# Patient Record
Sex: MALE | Race: ASIAN | Employment: UNEMPLOYED | URBAN - METROPOLITAN AREA
[De-identification: names, ages, dates, MRNs, and addresses within clinical notes are randomized per-mention and may not be internally consistent; named-entity substitution may affect disease eponyms.]

---

## 2020-02-10 ENCOUNTER — OFFICE VISIT (OUTPATIENT)
Dept: INTERNAL MEDICINE CLINIC | Age: 50
End: 2020-02-10
Payer: COMMERCIAL

## 2020-02-10 VITALS
SYSTOLIC BLOOD PRESSURE: 116 MMHG | BODY MASS INDEX: 32.51 KG/M2 | HEIGHT: 61 IN | DIASTOLIC BLOOD PRESSURE: 86 MMHG | OXYGEN SATURATION: 99 % | HEART RATE: 80 BPM | WEIGHT: 172.2 LBS

## 2020-02-10 PROBLEM — Z00.00 WELL ADULT EXAM: Status: ACTIVE | Noted: 2020-02-10

## 2020-02-10 PROCEDURE — 99386 PREV VISIT NEW AGE 40-64: CPT | Performed by: INTERNAL MEDICINE

## 2020-02-10 PROCEDURE — G8484 FLU IMMUNIZE NO ADMIN: HCPCS | Performed by: INTERNAL MEDICINE

## 2020-02-10 SDOH — ECONOMIC STABILITY: INCOME INSECURITY: HOW HARD IS IT FOR YOU TO PAY FOR THE VERY BASICS LIKE FOOD, HOUSING, MEDICAL CARE, AND HEATING?: NOT HARD AT ALL

## 2020-02-10 SDOH — ECONOMIC STABILITY: FOOD INSECURITY: WITHIN THE PAST 12 MONTHS, THE FOOD YOU BOUGHT JUST DIDN'T LAST AND YOU DIDN'T HAVE MONEY TO GET MORE.: NEVER TRUE

## 2020-02-10 SDOH — ECONOMIC STABILITY: TRANSPORTATION INSECURITY
IN THE PAST 12 MONTHS, HAS THE LACK OF TRANSPORTATION KEPT YOU FROM MEDICAL APPOINTMENTS OR FROM GETTING MEDICATIONS?: NO

## 2020-02-10 SDOH — ECONOMIC STABILITY: TRANSPORTATION INSECURITY
IN THE PAST 12 MONTHS, HAS LACK OF TRANSPORTATION KEPT YOU FROM MEETINGS, WORK, OR FROM GETTING THINGS NEEDED FOR DAILY LIVING?: NO

## 2020-02-10 SDOH — ECONOMIC STABILITY: FOOD INSECURITY: WITHIN THE PAST 12 MONTHS, YOU WORRIED THAT YOUR FOOD WOULD RUN OUT BEFORE YOU GOT MONEY TO BUY MORE.: NEVER TRUE

## 2020-02-10 SDOH — HEALTH STABILITY: MENTAL HEALTH: HOW OFTEN DO YOU HAVE A DRINK CONTAINING ALCOHOL?: NEVER

## 2020-02-10 ASSESSMENT — ENCOUNTER SYMPTOMS
SHORTNESS OF BREATH: 0
CHEST TIGHTNESS: 0
CONSTIPATION: 0
DIARRHEA: 0

## 2020-02-10 ASSESSMENT — PATIENT HEALTH QUESTIONNAIRE - PHQ9
SUM OF ALL RESPONSES TO PHQ QUESTIONS 1-9: 0
2. FEELING DOWN, DEPRESSED OR HOPELESS: 0
1. LITTLE INTEREST OR PLEASURE IN DOING THINGS: 0
SUM OF ALL RESPONSES TO PHQ QUESTIONS 1-9: 0
SUM OF ALL RESPONSES TO PHQ9 QUESTIONS 1 & 2: 0

## 2020-02-10 NOTE — PROGRESS NOTES
Patient: Marcelo Chacko is a 44 y.o. male who presents today with the following Chief Complaint(s):  Chief Complaint   Patient presents with    New Patient       HPI     Patient's primary language is Kish[place]. Interview is conducted via certified [employer] . Here today to establish. No significant PMHX. No medications. Has not complaints today. Did have a colonscopy in [place] that was negative. Was told to repeat in 10 years. Also had fasting blood work in [place] that was normal but cholesterol was a little high. Has been working on making some changes to his diet- doesn't eat a lot of meat and has been eating less rice. Has also cut out breakfast.     Has some concerns about his vision and his dental care. Labs from [place] from September 16, 2019 total cholesterol was 233; HDL 32; triglycerides 451; LDL unmeasurable due to high triglycerides    No Known Allergies   History reviewed. No pertinent past medical history. History reviewed. No pertinent surgical history.    Social History     Socioeconomic History    Marital status:      Spouse name: Not on file    Number of children: Not on file    Years of education: Not on file    Highest education level: Not on file   Occupational History    Not on file   Social Needs    Financial resource strain: Not hard at all    Food insecurity:     Worry: Never true     Inability: Never true   [employer] needs:     Medical: No     Non-medical: No   Tobacco Use    Smoking status: Never Smoker    Smokeless tobacco: Former User     Types: Chew   Substance and Sexual Activity    Alcohol use: Never     Frequency: Never    Drug use: Not on file    Sexual activity: Not on file   Lifestyle    Physical activity:     Days per week: Not on file     Minutes per session: Not on file    Stress: Not on file   Relationships    Social connections:     Talks on phone: Not on file     Gets together: Not on file     Attends Bahai service: Not on file

## 2020-03-11 PROBLEM — Z00.00 WELL ADULT EXAM: Status: RESOLVED | Noted: 2020-02-10 | Resolved: 2020-03-11

## 2021-02-08 DIAGNOSIS — Z12.5 PROSTATE CANCER SCREENING: ICD-10-CM

## 2021-02-08 DIAGNOSIS — Z00.00 WELL ADULT EXAM: ICD-10-CM

## 2021-02-08 DIAGNOSIS — E78.2 MIXED HYPERLIPIDEMIA: ICD-10-CM

## 2021-02-08 LAB
A/G RATIO: 1.3 (ref 1.1–2.2)
ALBUMIN SERPL-MCNC: 4.3 G/DL (ref 3.4–5)
ALP BLD-CCNC: 88 U/L (ref 40–129)
ALT SERPL-CCNC: 29 U/L (ref 10–40)
ANION GAP SERPL CALCULATED.3IONS-SCNC: 10 MMOL/L (ref 3–16)
AST SERPL-CCNC: 21 U/L (ref 15–37)
BASOPHILS ABSOLUTE: 0.1 K/UL (ref 0–0.2)
BASOPHILS RELATIVE PERCENT: 0.6 %
BILIRUB SERPL-MCNC: 0.3 MG/DL (ref 0–1)
BUN BLDV-MCNC: 7 MG/DL (ref 7–20)
CALCIUM SERPL-MCNC: 9.3 MG/DL (ref 8.3–10.6)
CHLORIDE BLD-SCNC: 100 MMOL/L (ref 99–110)
CHOLESTEROL, TOTAL: 262 MG/DL (ref 0–199)
CO2: 29 MMOL/L (ref 21–32)
CREAT SERPL-MCNC: 0.9 MG/DL (ref 0.9–1.3)
EOSINOPHILS ABSOLUTE: 0.3 K/UL (ref 0–0.6)
EOSINOPHILS RELATIVE PERCENT: 3.2 %
GFR AFRICAN AMERICAN: >60
GFR NON-AFRICAN AMERICAN: >60
GLOBULIN: 3.2 G/DL
GLUCOSE BLD-MCNC: 102 MG/DL (ref 70–99)
HCT VFR BLD CALC: 44 % (ref 40.5–52.5)
HDLC SERPL-MCNC: 31 MG/DL (ref 40–60)
HEMOGLOBIN: 14.5 G/DL (ref 13.5–17.5)
LDL CHOLESTEROL CALCULATED: ABNORMAL MG/DL
LDL CHOLESTEROL DIRECT: 159 MG/DL
LYMPHOCYTES ABSOLUTE: 3 K/UL (ref 1–5.1)
LYMPHOCYTES RELATIVE PERCENT: 32.6 %
MCH RBC QN AUTO: 27.8 PG (ref 26–34)
MCHC RBC AUTO-ENTMCNC: 33 G/DL (ref 31–36)
MCV RBC AUTO: 84.1 FL (ref 80–100)
MONOCYTES ABSOLUTE: 0.6 K/UL (ref 0–1.3)
MONOCYTES RELATIVE PERCENT: 6.1 %
NEUTROPHILS ABSOLUTE: 5.3 K/UL (ref 1.7–7.7)
NEUTROPHILS RELATIVE PERCENT: 57.5 %
PDW BLD-RTO: 13.8 % (ref 12.4–15.4)
PLATELET # BLD: 199 K/UL (ref 135–450)
PMV BLD AUTO: 10.9 FL (ref 5–10.5)
POTASSIUM SERPL-SCNC: 4 MMOL/L (ref 3.5–5.1)
PROSTATE SPECIFIC ANTIGEN: 2.68 NG/ML (ref 0–4)
RBC # BLD: 5.24 M/UL (ref 4.2–5.9)
SODIUM BLD-SCNC: 139 MMOL/L (ref 136–145)
T4 FREE: 1.1 NG/DL (ref 0.9–1.8)
TOTAL PROTEIN: 7.5 G/DL (ref 6.4–8.2)
TRIGL SERPL-MCNC: 472 MG/DL (ref 0–150)
TSH REFLEX: 5.02 UIU/ML (ref 0.27–4.2)
VLDLC SERPL CALC-MCNC: ABNORMAL MG/DL
WBC # BLD: 9.2 K/UL (ref 4–11)

## 2021-11-10 ENCOUNTER — OFFICE VISIT (OUTPATIENT)
Dept: INTERNAL MEDICINE CLINIC | Age: 51
End: 2021-11-10
Payer: COMMERCIAL

## 2021-11-10 VITALS
OXYGEN SATURATION: 99 % | BODY MASS INDEX: 32.9 KG/M2 | WEIGHT: 173 LBS | DIASTOLIC BLOOD PRESSURE: 88 MMHG | HEART RATE: 72 BPM | SYSTOLIC BLOOD PRESSURE: 126 MMHG

## 2021-11-10 DIAGNOSIS — Z00.00 WELL ADULT EXAM: Primary | ICD-10-CM

## 2021-11-10 DIAGNOSIS — Z12.5 PROSTATE CANCER SCREENING: ICD-10-CM

## 2021-11-10 DIAGNOSIS — Z12.11 COLON CANCER SCREENING: ICD-10-CM

## 2021-11-10 DIAGNOSIS — Z00.00 WELL ADULT EXAM: ICD-10-CM

## 2021-11-10 LAB
A/G RATIO: 1.5 (ref 1.1–2.2)
ALBUMIN SERPL-MCNC: 4.6 G/DL (ref 3.4–5)
ALP BLD-CCNC: 85 U/L (ref 40–129)
ALT SERPL-CCNC: 40 U/L (ref 10–40)
ANION GAP SERPL CALCULATED.3IONS-SCNC: 14 MMOL/L (ref 3–16)
AST SERPL-CCNC: 26 U/L (ref 15–37)
BASOPHILS ABSOLUTE: 0.1 K/UL (ref 0–0.2)
BASOPHILS RELATIVE PERCENT: 0.8 %
BILIRUB SERPL-MCNC: 0.5 MG/DL (ref 0–1)
BUN BLDV-MCNC: 8 MG/DL (ref 7–20)
CALCIUM SERPL-MCNC: 9 MG/DL (ref 8.3–10.6)
CHLORIDE BLD-SCNC: 100 MMOL/L (ref 99–110)
CHOLESTEROL, TOTAL: 258 MG/DL (ref 0–199)
CO2: 26 MMOL/L (ref 21–32)
CREAT SERPL-MCNC: 0.9 MG/DL (ref 0.9–1.3)
EOSINOPHILS ABSOLUTE: 0.4 K/UL (ref 0–0.6)
EOSINOPHILS RELATIVE PERCENT: 4.7 %
GFR AFRICAN AMERICAN: >60
GFR NON-AFRICAN AMERICAN: >60
GLUCOSE BLD-MCNC: 84 MG/DL (ref 70–99)
HCT VFR BLD CALC: 42.2 % (ref 40.5–52.5)
HDLC SERPL-MCNC: 35 MG/DL (ref 40–60)
HEMOGLOBIN: 14.2 G/DL (ref 13.5–17.5)
LDL CHOLESTEROL CALCULATED: 171 MG/DL
LYMPHOCYTES ABSOLUTE: 2.5 K/UL (ref 1–5.1)
LYMPHOCYTES RELATIVE PERCENT: 29.4 %
MCH RBC QN AUTO: 27.9 PG (ref 26–34)
MCHC RBC AUTO-ENTMCNC: 33.6 G/DL (ref 31–36)
MCV RBC AUTO: 83.2 FL (ref 80–100)
MONOCYTES ABSOLUTE: 0.5 K/UL (ref 0–1.3)
MONOCYTES RELATIVE PERCENT: 5.5 %
NEUTROPHILS ABSOLUTE: 5 K/UL (ref 1.7–7.7)
NEUTROPHILS RELATIVE PERCENT: 59.6 %
PDW BLD-RTO: 13.9 % (ref 12.4–15.4)
PLATELET # BLD: 204 K/UL (ref 135–450)
PMV BLD AUTO: 10 FL (ref 5–10.5)
POTASSIUM SERPL-SCNC: 4.1 MMOL/L (ref 3.5–5.1)
PROSTATE SPECIFIC ANTIGEN: 2.66 NG/ML (ref 0–4)
RBC # BLD: 5.07 M/UL (ref 4.2–5.9)
SODIUM BLD-SCNC: 140 MMOL/L (ref 136–145)
TOTAL PROTEIN: 7.6 G/DL (ref 6.4–8.2)
TRIGL SERPL-MCNC: 259 MG/DL (ref 0–150)
VLDLC SERPL CALC-MCNC: 52 MG/DL
WBC # BLD: 8.4 K/UL (ref 4–11)

## 2021-11-10 PROCEDURE — G8484 FLU IMMUNIZE NO ADMIN: HCPCS | Performed by: INTERNAL MEDICINE

## 2021-11-10 PROCEDURE — 99396 PREV VISIT EST AGE 40-64: CPT | Performed by: INTERNAL MEDICINE

## 2021-11-10 ASSESSMENT — PATIENT HEALTH QUESTIONNAIRE - PHQ9
SUM OF ALL RESPONSES TO PHQ QUESTIONS 1-9: 0
2. FEELING DOWN, DEPRESSED OR HOPELESS: 0
SUM OF ALL RESPONSES TO PHQ9 QUESTIONS 1 & 2: 0
SUM OF ALL RESPONSES TO PHQ QUESTIONS 1-9: 0
SUM OF ALL RESPONSES TO PHQ QUESTIONS 1-9: 0
1. LITTLE INTEREST OR PLEASURE IN DOING THINGS: 0

## 2021-11-10 ASSESSMENT — ENCOUNTER SYMPTOMS
DIARRHEA: 0
CONSTIPATION: 0
CHEST TIGHTNESS: 0
SHORTNESS OF BREATH: 0

## 2021-11-10 NOTE — PATIENT INSTRUCTIONS
We are moving! Our last day at [address]. will be November 30. We will have virtual appointments only on December 1 and in December 2 while we are in the process of moving. We will open in her new office on December 3. Our new office is located behind [address] on [address]. It is about 3 minutes from her current office. New office address:  [address] [place] [address], [facility]  [phone]      Please check with [facility] to see what dentists take your plan. I do think that [facility] accepts most insurances. They have several locations. Please get your COVID vaccine booster on/after 12/14/21.

## 2021-11-10 NOTE — PROGRESS NOTES
Patient: Adilia Caceres is a 46 y.o. male who presents today with the following Chief Complaint(s):  Chief Complaint   Patient presents with    Annual Exam       HPI     Here today for wellness visit. Patient's primary language is [place]. Interview is conducted via certified [employer] . Did receive COVID-19 vaccines [employer]) 5/22 and 6/12/22. He is doing well. Is due for fasting blood work. Is fasting. Has a small bump on his back and when he squeezes he gets pus out of the spot. Has been present x 1 year. Is getting a little bigger. Does not exercise. Works in an automobile factory making car parts. Had colonoscopy in [place] that was normal in 2019. Does not recall name of the hospital.     Does see an eye exam done at [facility]. Follows yearly. Does not see the dentist.     No Known Allergies   No past medical history on file. No past surgical history on file. Social History     Socioeconomic History    Marital status:      Spouse name: Not on file    Number of children: Not on file    Years of education: Not on file    Highest education level: Not on file   Occupational History    Not on file   Tobacco Use    Smoking status: Never Smoker    Smokeless tobacco: Former User     Types: Chew   Vaping Use    Vaping Use: Never used   Substance and Sexual Activity    Alcohol use: Never    Drug use: Not on file    Sexual activity: Not on file   Other Topics Concern    Not on file   Social History Narrative    Not on file     Social Determinants of Health     Financial Resource Strain:     Difficulty of Paying Living Expenses: Not on file   Food Insecurity:     Worried About [address] in the Last Year: Not on file    [address] in the Last Year: Not on file   Transportation Needs:     Lack of Transportation (Medical): Not on file    Lack of Transportation (Non-Medical):  Not on file   Physical Activity:     Days of Exercise per Week: Not on file   Federated Sample of Exercise per Session: Not on file   Stress:     Feeling of Stress : Not on file   Social Connections:     Frequency of Communication with Friends and Family: Not on file    Frequency of Social Gatherings with Friends and Family: Not on file    Attends Spiritism Services: Not on file    Active Member of [address] [place] [address] [employer] or Organizations: Not on file    Attends Club or Organization Meetings: Not on file    Marital Status: Not on file   Intimate Partner Violence:     Fear of Current or Ex-Partner: Not on file    Emotionally Abused: Not on file    Physically Abused: Not on file    Sexually Abused: Not on file   Housing Stability:     Unable to Pay for Housing in the Last Year: Not on file    Number of [place] in the Last Year: Not on file    Unstable Housing in the Last Year: Not on file     Family History   Problem Relation Age of Onset    High Blood Pressure Mother     High Blood Pressure Sister     High Blood Pressure Brother         No outpatient medications prior to visit. No facility-administered medications prior to visit. Patient'spast medical history, surgical history, family history, medications,  and allergies  were all reviewed and updated as appropriate today. Review of Systems   Constitutional: Negative for appetite change, fatigue and fever. Respiratory: Negative for chest tightness and shortness of breath. Cardiovascular: Negative for chest pain. Gastrointestinal: Negative for constipation and diarrhea. Skin: Negative for rash. Neurological: Positive for headaches (occasional, gets better with otc medicines). /88   Pulse 72   Wt 173 lb (78.5 kg)   SpO2 99%   BMI 32.90 kg/m²   Physical Exam  Vitals and nursing note reviewed. Constitutional:       Appearance: He is well-developed. He is not toxic-appearing. HENT:      Head: Normocephalic.       Right Ear: Tympanic membrane, ear canal and external ear normal.      Left Ear: Tympanic Differential    Comprehensive Metabolic Panel    Lipid Panel    PSA screening    POCT Fecal Immunochemical Test (FIT)      Other Visit Diagnoses     Prostate cancer screening        Relevant Orders    PSA screening          No current outpatient medications on file. No current facility-administered medications for this visit. Return in about 6 months (around 5/10/2022) for follow up. [facility]

## 2021-11-10 NOTE — ASSESSMENT & PLAN NOTE
Patient had colonoscopy in [place] in 2018 or 2019. He does not recall the location or name of the hospital cannot get a copy of his colonoscopy.   Check FIT test.

## 2021-11-10 NOTE — ASSESSMENT & PLAN NOTE
No problems identified on exam.  Check fasting blood work. Will give vaccine today. Will need COVID-19 booster in mid December. Encourage patient to schedule appointment with the dentist.  Check PSA. Check FIT stool test.  Colonoscopy done in [place] in 2018 or 2019. Patient does not recall where so we are unable to request a copy of his colonoscopy.

## 2021-11-14 DIAGNOSIS — E78.5 HYPERLIPIDEMIA LDL GOAL <100: Primary | ICD-10-CM

## 2021-11-14 RX ORDER — ATORVASTATIN CALCIUM 20 MG/1
20 TABLET, FILM COATED ORAL DAILY
Qty: 90 TABLET | Refills: 2 | Status: SHIPPED | OUTPATIENT
Start: 2021-11-14 | End: 2022-05-10 | Stop reason: SDUPTHER

## 2021-11-17 DIAGNOSIS — Z12.11 COLON CANCER SCREENING: Primary | ICD-10-CM

## 2021-11-17 LAB
CONTROL: NORMAL
HEMOCCULT STL QL: NORMAL

## 2021-11-17 PROCEDURE — 82274 ASSAY TEST FOR BLOOD FECAL: CPT | Performed by: INTERNAL MEDICINE

## 2021-12-10 PROBLEM — Z12.11 COLON CANCER SCREENING: Status: RESOLVED | Noted: 2021-11-10 | Resolved: 2021-12-10

## 2021-12-10 PROBLEM — Z00.00 WELL ADULT EXAM: Status: RESOLVED | Noted: 2020-02-10 | Resolved: 2021-12-10

## 2022-05-10 ENCOUNTER — OFFICE VISIT (OUTPATIENT)
Dept: INTERNAL MEDICINE CLINIC | Age: 52
End: 2022-05-10
Payer: COMMERCIAL

## 2022-05-10 VITALS
HEART RATE: 76 BPM | SYSTOLIC BLOOD PRESSURE: 112 MMHG | OXYGEN SATURATION: 97 % | BODY MASS INDEX: 31.91 KG/M2 | DIASTOLIC BLOOD PRESSURE: 86 MMHG | WEIGHT: 167.8 LBS

## 2022-05-10 DIAGNOSIS — Z12.5 PROSTATE CANCER SCREENING: ICD-10-CM

## 2022-05-10 DIAGNOSIS — Z00.00 WELL ADULT EXAM: ICD-10-CM

## 2022-05-10 DIAGNOSIS — J30.2 SEASONAL ALLERGIES: ICD-10-CM

## 2022-05-10 DIAGNOSIS — Z78.9 LANGUAGE BARRIER AFFECTING HEALTH CARE: ICD-10-CM

## 2022-05-10 DIAGNOSIS — E78.5 HYPERLIPIDEMIA LDL GOAL <100: Primary | ICD-10-CM

## 2022-05-10 PROCEDURE — G8427 DOCREV CUR MEDS BY ELIG CLIN: HCPCS | Performed by: INTERNAL MEDICINE

## 2022-05-10 PROCEDURE — 1036F TOBACCO NON-USER: CPT | Performed by: INTERNAL MEDICINE

## 2022-05-10 PROCEDURE — G8417 CALC BMI ABV UP PARAM F/U: HCPCS | Performed by: INTERNAL MEDICINE

## 2022-05-10 PROCEDURE — 3017F COLORECTAL CA SCREEN DOC REV: CPT | Performed by: INTERNAL MEDICINE

## 2022-05-10 PROCEDURE — 99214 OFFICE O/P EST MOD 30 MIN: CPT | Performed by: INTERNAL MEDICINE

## 2022-05-10 RX ORDER — FLUTICASONE PROPIONATE 50 MCG
2 SPRAY, SUSPENSION (ML) NASAL DAILY PRN
Qty: 1 EACH | Refills: 5 | Status: SHIPPED | OUTPATIENT
Start: 2022-05-10

## 2022-05-10 RX ORDER — FEXOFENADINE HCL 180 MG/1
180 TABLET ORAL DAILY PRN
Qty: 30 TABLET | Refills: 5 | Status: SHIPPED | OUTPATIENT
Start: 2022-05-10 | End: 2022-06-09

## 2022-05-10 RX ORDER — ATORVASTATIN CALCIUM 20 MG/1
20 TABLET, FILM COATED ORAL DAILY
Qty: 90 TABLET | Refills: 2 | Status: SHIPPED | OUTPATIENT
Start: 2022-05-10

## 2022-05-10 SDOH — ECONOMIC STABILITY: FOOD INSECURITY: WITHIN THE PAST 12 MONTHS, YOU WORRIED THAT YOUR FOOD WOULD RUN OUT BEFORE YOU GOT MONEY TO BUY MORE.: NEVER TRUE

## 2022-05-10 SDOH — ECONOMIC STABILITY: FOOD INSECURITY: WITHIN THE PAST 12 MONTHS, THE FOOD YOU BOUGHT JUST DIDN'T LAST AND YOU DIDN'T HAVE MONEY TO GET MORE.: NEVER TRUE

## 2022-05-10 ASSESSMENT — SOCIAL DETERMINANTS OF HEALTH (SDOH): HOW HARD IS IT FOR YOU TO PAY FOR THE VERY BASICS LIKE FOOD, HOUSING, MEDICAL CARE, AND HEATING?: NOT HARD AT ALL

## 2022-05-10 NOTE — PATIENT INSTRUCTIONS
Try Flonase (fluticosone)  for allergies. You only need this to coat the inside of your nose, you do not need to sniff or snort this. Simply spray it in to your nose (pointed towards the outside) and blot the excess with a tissue. Take Lipitor (atorvastatin) daily. This is for your cholesterol. This works best if taken at night but really take it daily. Take Allegra (fexofenadine) daily as needed for allergies. Patient Education        ???? ???????????: ??????? ??????????? High Cholesterol: Care Instructions  ??????? ??????? ???????????     ??????????? ????? ??????? ????? ???? ???????? ?????? ??? ?? ???? ?????? ????????????? ???? ?????? ???? ????? ???? ?????? ?????? ??????????? ??????? ?????????? ??????? ??? ?? ??????? ???????? ??????? ??? ????? ???? ??????????? ??????? ??????? ????? ???????? ?????? ???? ??? ???? ??????? ??????? ? ??????????? ?????? ?????  LDL ?  HDL ??????? ????? ????????????? ??? ????? LDL \"????\" ??????????? ??? ???? LDL ?? ??????? ???? ???, ???????? ? ?????? ????? ?????? ???? ????? HDL \"??????\" ??????????? ??? ???? ??????? ??????? ???? ?????????????? ??? ???? ????? ?????????? LDL ?? ???? ???, ???????? ? ?????? ?? ???????? ????? ??????  ??????? ??????????? ????????? ??????? ????????? ???? ??? ?? ?????? ???? ??????? ???????? ???? ????? ????? ?????? ????? ? ??????? ???????? ??????? ???????? ?? ???? ?????? ? ?? ??? ? ??????? ???? ?? ????? ?????? ????? ????? ?????? ???????????? ???????????  ????????????? ???? ?????? ?????? ?????????? ??????? ??????????? ? ??????? ???????? ?? ???? ????? ?????? ??????? ????? ?? ???? ??????? ???? ????????? ??????? ???????? ? ?????? ???? ??????? ???? ????? ??? ? ????? ?????? ?????? ?????? ?? ??????? ??????? ?????? ?????? ????? ????? ????????? ? ????? ????????? ?????? ??????  ???-?? ?????? ??????? ????? ??? ????????? ????? ??? ??? ??? ????????????? ??? ??? ????? ??????? ????????? ???? ??? ???????? ????????? ??????? ? ??? ????? ????????? ?? ?????????? ????? ?????? ????????? ???? ???????? ????? ???? ????????? ???? ?????? ??? ?? ?????? ????? ???  ??????? ???? ????? ??????? ???? ???? ???????????   ???? ??? ??????? ???????? ??????? ????????? ?????? ??????? ?????? ?????, ????????, ???????? ??????? (?? ???? ?????), ????? ????? ??? ???, ?? ? ????????, ???? ?????????? (????? ????) ??? ??????? ?? ??-??? ???? ????? ?????????? ?????? ????   ?????, ?????????? ? ?????????????? ?? ????? ????? ????????????? ????? ????? ????? ? ???????? ??? ????????? (?????????? ???? ???????? ??? ??????? ??????)   ???? ?????? ????? ????, ???????? ???? ? ???? ??????? (????? ????) ?????? ??????????   ??????????????? ??????? ???? ? ??????? ?????? ???????? ????? ?????, ?????????? ? ???????? ????? ??????????   ?????, ?????? ????? ?? ????? ?????? ???????? ?????????? ????? ????????????   ??????? ????? ?????? ????????? ??????? ???????? ????? ??????? 30 ??????? ??????? ??????????? ??????? ?????? ????? ??? ??????? ??????, ???? ??????, ????? ?????? ?? ????? ?????? ?? ?????? ?????? ????? ???? ????????????? ???? ??? ???????????   ?????? ????? ???????? ?? ???? ???????? ?????? ??? ??????? ???????????? ???????? ????? ??? ??????????? ????? ?????? ???? 5 ???? 10 ?????????? ??????? ???, ??????? ???? ???? ?? ????????? ????? ?? ??????   ???????? ???????????  ??????? ??????? ???? ????? ??? ??????????  ??????? ??????????? ???? ????????????? ???? ???????? ??????? ????????? ? ???????? ???? ????? ????????? ??????? ???? ??????? ?????????:   ???????? ???????? ???????? ??????? ??????? ???????? ?????   ???????? ????? ???? ???????? ??????????? ????  ??????? ?? ???? ????? ???????????  ???? ????????   [email or web address]  ???????? ????????? Jeth.Beth ?????? ?? ????? ??? ?????? \"???? ???????????: ??????? ???????????.\"  ?? ????? ???: 2022 01 10               ????????? ?????: 13.2  © 6271-5747 Healthwise, Incorporated.    ???????? ?????????????? ?????? ???????? ???????? ??????? ????????? ?????? ????? ??????? ??? ???????? ?? ???????? ?????? ?? ?? ?????????? ?????? ????????? ??? ???, ???? ????? ????????? ?????? ????? ????????? ??????????? Healthwise, Incorporated, ?? ??????? ?? ????????? ???????? ???? ???? ??? ???????? ???????????? ???????? ?????? Patient Education        ???? ??????????? ???? ?????? Learning About High Cholesterol  ? ??? ??????????? ????? ?? ???     ??????????? ????? ??????? ????? ???? ???????? ?????? ??? ?? ???? ?????? ????????????? ???? ?????? ???? ????? ???? ?????? ?????? ??????????? ????????????????? ??????? ??? ?? ??????? ???????? ??????? ??? ?????  ???????? ???????? ??????????? ? ??? ???? ??????? ????????? ????? ??? ???? ?????? ????? ???????????? ??????, ?? ???????????????? ??????? ????????????????? ??????? ??????? ? ?????? ????? ?????? ??????  ????????????? ??????? ????????? ???? LDL \"????\" ??????????? ??? ???? LDL ?? ??????? ???? ???, ???????? ? ?????? ????? ?????? ???? ????? HDL \"??????\" ??????????? ??? ???? HDL ???? ???, ???????? ? ?????? ???? ?? ???????? ??????????? ??  ??????? ??????????? ????????? ??????? ????????? ???? ??? ?? ?????? ???? ??????????????? ???? ????? ????? ??????  ????? ???? ????????????? ?????? ???? ???? ???????????  ???? ?????? ???? ???? ?????? ???????? ???? ?????????? ????? ????? ?????????????? ?? ?????????? ??????? ? ?????????? ???? ??????? ????? ?? ?????   ??????? ??????-?????? ??????? ?????????  ? ?????, ????????, ????? ???? (????? ?????), ?????? ??????? ? ???, ???? ? ????????, ???? ?????????? (????? ????) ? ????-???? ?? ??-???? ?? ????? ?????????? ?????????  ? ?????, ?????????? ? ?????????????? ?? ????? ????? ????????????? ????? ????? ????? ? ???????? ??? ????????? (?????????? ???? ???????? ??? ??????? ??????)  ? ???? ?????? ????? ????, ???????? ???? ? ???? ??????? (????? ????) ?????? ??????????  ? ??????????????? ??????? ???? ? ??????? ?????? ???????? ????? ?????, ?????????? ? ???????? ????? ??????????   ?????? ????????? ????????? ??????? ??????????? ?????? ????? ?????? ????? ??????? ???????? ????? ??????? 30 ??????? ??????? ??????????? ??????? ?????? ????? ??? ??????? ??????, ???? ??????, ????? ?????? ?? ????? ?????? ?? ?????? ?????? ????? ???? ????????????? ???? ??? ???????????   ?? ?????? ????? ????????? ???????? ?????? ? ??? ??? ???????????   ???????? ?????????? ??? ???????? ??????? ????? ???????, ????? ????????? ????????-?????? ????????? ? ????????? ?????? ???? ?????????? ????? ??????? ???????? ???? ???????? ???? ????? ???????  ???? ??????????? ???? ????? ???????  ??????? ?????? ????? ???????? ??????? ?? ??????? ???? ??????????? ?? ?????? ????????? ????? ??????? ????????????? ????????????? ?? ?????? ????? ?????   ??????? ?????? ??????????? ????, ???????? ??????, ??? ??????? ? ?? ?????? ???? ????? ???????? ??????????? ????? ???????????   ??????? ???? ???? ?????????? ????????  ???-?? ?????? ??????? ????? ??? ????????? ????? ??? ??? ??? ????????????? ??? ??? ????? ??????? ????????? ???? ??? ???????? ????????? ??????? ? ??? ????? ????????? ?? ?????????? ????? ?????? ????????? ???? ???????? ????? ???? ????????? ???? ?????? ??? ?? ?????? ????? ???  ??????? ?? ???? ????? ???????????  ???? ????????   [email or web address]  ???????? ????????? Q621 ?????? ?? ????? ??? ?????? \"???? ??????????? ???? ??????.\"  ?? ????? ???: 2022 01 10               ????????? ?????: 13.2  © 8139-8267 Healthwise, Incorporated. ???????? ?????????????? ?????? ???????? ???????? ??????? ????????? ?????? ????? ??????? ??? ???????? ?? ???????? ?????? ?? ?? ?????????? ?????? ????????? ??? ???, ???? ????? ????????? ?????? ????? ????????? ??????????? Healthwise, Incorporated, ?? ??????? ?? ????????? ???????? ???? ???? ??? ???????? ???????????? ???????? ?????? Patient Education        ?????????: ??????? ???????????   Allergies: Care Instructions  ??????? ??????? ???????????     ????????? ???????? ??????????? ?????? ??????? ?????? ????? ?????????? ??????? (?????????? ???????) ?????? ????? ???????? ??????? ???????? ?????? ?? ???? ??? ????? ?????, ?????????? ????????? ???????? ????? ?????????? ????? ????? ????, ????, ????, ?????, ??????? ???? ? ???? ?????? ???? ??????? ?? ????????????????? ??? ???????  ????????? ????? ?? ?????? ??? ????? ????? ????????? ????? ??????? ???? ???????? ?? ???????????? ????? ?????? ?????? ???? ?????  ??????? ???????????? ???? ?????? ?????? ?????? ???????????? ???? ??? ??????? ???????? ??????? ??????????? ??????? ??-?? ??? ??? ???? ????? ????? ?????? ???? ???????? ??????????? ??????? ????????? ??? ????? ??? ??????????? ???????? ????? ??????????? ?????? ????? ??-?? ??????? ???? ??? ???? ?????, ??????? ?????????? ????????????? ???? ???????? ???????? ? ???? ????????? ???????? ????????????????? ?????  ???????????????? ?????? ??????? ???????? ??????? ?????? ?????? ( ???????????? ??????????????) ???? ??????????? ????, ??????? ???????? ???????? ???? ???? ????????????? ???????? ???????? ????? ?????????? ?? ??? ????? ????? ?????? ???? ?? ????????? ?????????? ? ????? ???? ????? ????????? ??????????? ???? ??????????? ????? ??? ??????? ?????????  ???-?? ?????? ??????? ????? ??? ????????? ????? ??? ??? ??? ????????????? ??? ??? ????? ??????? ????????? ???? ??? ???????? ????????? ??????? ? ??? ????? ????????? ?? ?????????? ????? ?????? ????????? ???? ???????? ????? ???? ????????? ???? ?????? ??? ?? ?????? ????? ???  ??????? ???? ????? ??????? ???? ???? ???????????   ??????? ???????? ???? ???? ?? ?????? ?? ?? ??? ??????? ???????? ????????? ? ??? ????? ?????? ???? ?? ??????????:  ? ????????, ???????? ?????? ?? ???? ???????? ???? ????? ???? ?????? ?????????  ? ??????, ????? ? ?????????????? ???? ????-???? ?????? ?????? ?????????? ??????????? ?????? ????? ???????? ?????? ? ??????????????? ?????? ?????? ??????? ????????????? ?????? ???????? ????????? ?????????  ? ???????? ??????? ????? ??? ????????? ?????? ???????????  ? ??????? ??????? ???? ???? ??????? ??? ????? ???????? ????? ?????? ??????????  ? ??????? ?????? ??????? ???? ?????? ? ?????? ????? ??? ?????????? ????? ????? ??????????   ???????? ???? ???? ? ????? ?????? ? ??? ??? ???????????? ?????? ??????????? ??????? ???????? ??????? ???? ??? ????? ????????? ???? ????? ?????????? ????? ??? ???????????   ??????????? ???????? ??????????? ??????????? ??????? ?????????????? ???????? ?????????? ????? ????? ???? ???????? ??????? ?????? ?????????? ??????? ????? ?? ????????? ??? ?????????? ??????????? ??????? ????, ??????????, ???? ???? ?? ??????? ??????? ?????? ?????? ?????????? ?????????? ??????? ????? ?? ????? ??????????? ? ????? ??? ?????????? ???????? ??? ???? ??????????? ????????? ??????? ???? ???? ????? ???? ??? ??????? ??? ??????????   ???????? ??????? ?????? ? ??? ???????, ??????, ? ???? ?????? ???? ???? ??????????? ????? ??????????? ???????? ?????? ???? ????????????   ??????? ?????? ???? ?? ?????? ?????????? ?????? ? ??? ??? ????????? ?????? ?????????? ???? ????? ???????? ???????????? ??? ?? ???????? ?????????? ???????? ???? ???????????   ???????? ??????? ?????? ? ??? ???????? ?????? ???? ????? ????? ?????????? ??????? ??????? ??? ??? HEPA ?????? ?? ?????? ?????? ?????? ???? ??????? ?????? ?????? ??????????   ???? ??????? ???? ????? ????? ?????????? ??????? ?????, ???????? ? ???? ??? ?????????? ??????????   ??????? ????????? ???? ?????? ???????????? ?????????? ??????????? ???? ????????? ??????? ?? ?????? ???????? ?????????? ?? ???? ?????? ??? ???? ?????????? ?????? ?? ??? ?????? ?????????? ?????? ???????????   ???????? ???????? ??????????? ?????? ??? ??? ???? ????? ????? ??????? ???? ?????? ???????? ?????????? ???? ????????????? ????????? ?????? ??????????   ???????? ???????? ??????? ?????? ?????? ??? ??? ???????? ??????????? ????? ?? ??? ?????? ???? ????? ??????????? ?????? ??????? ??? ????????? ?????? ??????? ?????? ??? ????? ???? ????? ???????? ??????? ??????????? ???? ?????? ????????  ??????? ??????? ???? ????? ??? ??????????  ?????????? ?? ???????? ???:   ???????? ?????? ?????? ??????????? ???? ???????   ???????? ??????? ??? ???? ????????? ???????? ???? ????? ???????? ??? ????? ?????????? ?????  ???????? ?????? ????? ?? ?????, ???????????? ??? ?????? ??? 911 ?? ?? ??????????  911 ?? ?? ????????? ???:   ??????? ?????? ?????? ????????????? ???????? ???? ????????? ????? ???? ??????:  ? ??????? ??????? ????? ??????? ???? ???????????  ? ?????, ???, ?? ?? ?????? ?????????  ? ??????????????? ???????  ? (????? ??????) ?? ??????????? ????? ???? ????? ???? ????? ?????? ?? ????? ????? ????? ?????, ??????? ???? ?? ??????? ???? ????? ???? ???????????   ???????? ?? ?????? ?????? ???, ???????????? ??? ????? ????,?  ????? ????????? ????? ?? ????????? ?? ???????? ???????? ??????? ??????????, ???:   ???????? ??????? ????????????? ???????? ???, ?????:  ? ????? ???? ?? ??????? ???? (???? ???? ?????? ?????? ????)?  ? ????????  ? ?????????  ? ??? ??????, ??????? ?? ????? ?????  ??????? ??????????? ???? ????????????? ???? ???????? ??????? ????????? ? ???????? ???? ????? ????????? ??????? ???? ??????? ?????????:   ????? ??????? ??? ?????? ???? ???????? ????  ??????? ?? ???? ????? ???????????  ???? ????????   [email or web address]  ???????? ????????? W171 ?????? ?? ????? ??? ?????? \"?????????: ??????? ???????????.\"  ?? ????? ???: 2021 02 10               ????????? ?????: 13.2  © 2106-3372 Healthwise, Incorporated. ???????? ?????????????? ?????? ???????? ???????? ??????? ????????? ?????? ????? ??????? ??? ???????? ?? ???????? ?????? ?? ?? ?????????? ?????? ????????? ??? ???, ???? ????? ????????? ?????? ????? ????????? ??????????? Healthwise, Incorporated, ?? ??????? ?? ????????? ???????? ???? ???? ??? ???????? ???????????? ???????? ?????? Patient Education        High Cholesterol: Care Instructions  Overview     Cholesterol is a type of fat in your blood. It is needed for many body functions, such as making new cells. Cholesterol is made by your body. It also comes from food you eat. High cholesterol means that you have too much of thefat in your blood. This raises your risk of a heart attack and stroke. LDL and HDL are part of your total cholesterol. LDL is the \"bad\" cholesterol. High LDL can raise your risk for coronary artery disease, heart attack, and stroke. HDL is the \"good\" cholesterol. It helps clear bad cholesterol from the body. High HDL is linked with a lower risk of coronary artery disease, heartattack, and stroke. Your cholesterol levels help your doctor find out your risk for having a heart attack or stroke. You and your doctor can talk about whether you need to loweryour risk and what treatment is best for you. Treatment options include a heart-healthy lifestyle and medicine. Both options can help lower your cholesterol and your risk. The way you choose to lower your risk will depend on how high your risk is for heart attack and stroke. It willalso depend on how you feel about taking medicines. Follow-up care is a key part of your treatment and safety. Be sure to make and go to all appointments, and call your doctor if you are having problems. It's also a good idea to know your test results and keep alist of the medicines you take. How can you care for yourself at home?  Eat heart-healthy foods. ? Eat fruits, vegetables, whole grains, beans, and other high-fiber foods. ? Eat lean proteins, such as seafood, lean meats, beans, nuts, and soy products. ? Eat healthy fats, such as canola and olive oil. ? Choose foods that are low in saturated fat. ? Limit sodium and alcohol. ? Limit drinks and foods with added sugar.  Be physically active. Try to do moderate activity at least 2½ hours a week. Or try to do vigorous activity at least 1¼ hours a week. You may want to walk or try other activities, such as running, swimming, cycling, or playing tennis or team sports.    Stay at a healthy weight or lose weight by making the changes in eating and physical activity listed above. Losing just a small amount of weight, even 5 to 10 pounds, can help reduce your risk for having a heart attack or stroke.  Do not smoke.  Manage other health problems. These include diabetes and high blood pressure. If you think you may have a problem with alcohol or drug use, talk to your doctor.  If you take medicine, take it exactly as prescribed. Call your doctor if you think you are having a problem with your medicine.  Check with your doctor or pharmacist before you use any other medicines, including over-the-counter medicines. Make sure your doctor knows all of the medicines, vitamins, herbal products, and supplements you take. Taking some medicines together can cause problems. When should you call for help? Watch closely for changes in your health, and be sure to contact your doctor if:     You need help making lifestyle changes.      You have questions about your medicine. Where can you learn more? Go to [email or web address] and sign in to your [employer] account. Enter H276 in the [employer] box to learn more about \"High Cholesterol: Care Instructions. \"     If you do not have an account, please click on the \"Sign Up Now\" link. Current as of: January 10, 2022               Content Version: 13.2  © 2006-2022 Healthwise, Incorporated. Care instructions adapted under license by [facility] ([facility]). If you have questions about a medical condition or this instruction, always ask your healthcare professional. [place] [address] any warranty or liability for your use of this information. Patient Education        Learning About High Cholesterol  What is high cholesterol? High cholesterol means that you have too much cholesterol in your blood. Cholesterol is a type of fat. It's needed for many body functions, such as making new cells. Cholesterol is made by your body.  It also comes from food youeat. Having high cholesterol can lead to the buildup of plaque in artery walls. Thiscan increase your risk of heart attack and stroke. When your doctor talks about high cholesterol levels, your doctor is talking about your total cholesterol and LDL cholesterol (the \"bad\" cholesterol) levels. Your doctor may also speak about HDL (the \"good\" cholesterol) levels. High HDL is linked with a lower risk for coronary artery disease, heart attack,and stroke. Your cholesterol levels help your doctor find out your risk for having a heartattack or stroke. How can you help prevent high cholesterol? A heart-healthy lifestyle can help you prevent high cholesterol and lower yourrisk for a heart attack and stroke.  Eat heart-healthy foods. ? Eat fruits, vegetables, whole grains, beans, and other high-fiber foods. ? Eat lean proteins, such as seafood, lean meats, beans, nuts, and soy products. ? Eat healthy fats, such as canola and olive oil. ? Choose foods that are low in saturated fat. ? Limit sodium and alcohol. ? Limit drinks and foods with added sugar.  Be active. Try to do moderate activity at least 2½ hours a week. Or try vigorous activity at least 1¼ hours a week. You may want to walk or try other activities, such as running, swimming, cycling, or playing tennis or team sports.  Stay at a healthy weight. Lose weight if you need to.  Don't smoke. If you need help quitting, talk to your doctor about stop-smoking programs and medicines. These can increase your chances of quitting for good. How is high cholesterol treated? The goal of treatment is to reduce your chances of having a heart attack orstroke. The goal is not to lower your cholesterol numbers only.  Have a heart-healthy lifestyle. This includes eating healthy foods, not smoking, losing weight, and being more active.  You may choose to take medicine. Follow-up care is a key part of your treatment and safety.  Be sure to make and go to all appointments, and call your doctor if you are having problems. It's also a good idea to know your test results and keep alist of the medicines you take. Where can you learn more? Go to [email or web address] and sign in to your [employer] account. Enter Y264 in the [facility] box to learn more about \"Learning About High Cholesterol. \"     If you do not have an account, please click on the \"Sign Up Now\" link. Current as of: January 10, 2022               Content Version: 13.2  © 6816-8679 [employer]. Care instructions adapted under license by [facility] ([facility]). If you have questions about a medical condition or this instruction, always ask your healthcare professional. [address] any warranty or liability for your use of this information. Patient Education        Allergies: Care Instructions  Overview     Allergies occur when your body's defense system (immune system) overreacts to certain substances. The immune system treats a harmless substance as if it were a harmful germ or virus. Many things can make this happen. These includepollens, medicine, food, dust, animal dander, and mold. Allergies can be mild or severe. Mild allergies can be managed with hometreatment. But medicine may be needed to prevent problems. Managing your allergies is an important part of staying healthy. Your doctor may suggest that you have allergy testing to help find out what is causing yourallergies. Severe allergies can cause reactions that affect your whole body (anaphylactic reactions). Your doctor may prescribe a shot of epinephrine to carry with you in case you have a severe reaction. Learn how to give yourself the shot andkeep it with you at all times. Make sure it is not . Follow-up care is a key part of your treatment and safety. Be sure to make and go to all appointments, and call your doctor if you are having problems.  It's also a good idea to know your test results and keep alist of the medicines you take. How can you care for yourself at home?  If you have been told by your doctor that dust or dust mites are causing your allergy, decrease the dust around your bed:  ? Wash sheets, pillowcases, and other bedding in hot water every week. ? Use dust-proof covers for pillows, duvets, and mattresses. Avoid plastic covers because they tear easily and do not \"breathe. \" Wash as instructed on the label. ? Do not use any blankets and pillows that you do not need. ? Use blankets that you can wash in your washing machine. ? Consider removing drapes and carpets, which attract and hold dust, from your bedroom.  If you are allergic to house dust and mites, do not use home humidifiers. Your doctor can suggest ways you can control dust and mites.  Look for signs of cockroaches. Cockroaches cause allergic reactions. Use cockroach baits to get rid of them. Then, clean your home well. Cockroaches like areas where grocery bags, newspapers, empty bottles, or cardboard boxes are stored. Do not keep these inside your home, and keep trash and food containers sealed. Seal off any spots where cockroaches might enter your home.  If you are allergic to mold, get rid of furniture, rugs, and drapes that smell musty. Check for mold in the bathroom.  If you are allergic to outdoor pollen or mold spores, use air-conditioning. Change or clean all filters every month. Keep windows closed.  If you are allergic to pollen, stay inside when pollen counts are high. Use a vacuum  with a HEPA filter or a double-thickness filter at least two times each week.  Stay inside when air pollution is bad. Avoid paint fumes, perfumes, and other strong odors.  Avoid conditions that make your allergies worse. Stay away from smoke. Do not smoke or let anyone else smoke in your house. Do not use fireplaces or wood-burning stoves.    If you are allergic to your pets, change the air filter in your furnace every month. Use high-efficiency filters.  If you are allergic to pet dander, keep pets outside or out of your bedroom. Old carpet and cloth furniture can hold a lot of animal dander. You may need to replace them. When should you call for help? Give an epinephrine shot if:     You think you are having a severe allergic reaction.      You have symptoms in more than one body area, such as mild nausea and an itchy mouth. After giving an epinephrine shot call 911, even if you feel better. Call 911 if:     You have symptoms of a severe allergic reaction. These may include:  ? Sudden raised, red areas (hives) all over your body. ? Swelling of the throat, mouth, lips, or tongue. ? Trouble breathing. ? Passing out (losing consciousness). Or you may feel very lightheaded or suddenly feel weak, confused, or restless.      You have been given an epinephrine shot, even if you feel better. Call your doctor now or seek immediate medical care if:     You have symptoms of an allergic reaction, such as:  ? A rash or hives (raised, red areas on the skin). ? Itching. ? Swelling. ? Belly pain, nausea, or vomiting. Watch closely for changes in your health, and be sure to contact your doctor if:     You do not get better as expected. Where can you learn more? Go to [email or web address]://[email or web address].[email or web address] Technologieseweb.[email or web address]. org and sign in to your [employer] account. Enter J818 in the [facility] box to learn more about \"Allergies: Care Instructions. \"     If you do not have an account, please click on the \"Sign Up Now\" link. Current as of: February 10, 2021               Content Version: 13.2  © 4379-1524 Healthwise, Incorporated. Care instructions adapted under license by [facility] ([facility]).  If you have questions about a medical condition or this instruction, always ask your healthcare professional. [address] any warranty or liability for your use of this information. Patient Education        Learning About the Mediterranean Diet  What is the [address]? The Mediterranean diet is a style of eating rather than a diet plan. It features foods eaten in [place], [place], [place] and [place], and other countries along the [facility]. It emphasizes eating foods like fish, fruits, vegetables, beans, high-fiber breads and whole grains, nuts, and oliveoil. This style of eating includes limited red meat, cheese, and sweets. Why choose the Mediterranean diet? A Mediterranean-style diet may improve heart health. It contains more fat than other heart-healthy diets. But the fats are mainly from nuts, unsaturated oils (such as fish oils and olive oil), and certain nut or seed oils (such as canola, soybean, or flaxseed oil). These fats may help protect the heart andblood vessels. How can you get started on the Mediterranean diet? Here are some things you can do to switch to a more Mediterranean way of eating. What to eat   Eat a variety of fruits and vegetables each day, such as grapes, blueberries, tomatoes, broccoli, peppers, figs, olives, spinach, eggplant, beans, lentils, and chickpeas.  Eat a variety of whole-grain foods each day, such as oats, brown rice, and whole wheat bread, pasta, and couscous.  Eat fish at least 2 times a week. Try tuna, salmon, mackerel, lake trout, herring, or sardines.  Eat moderate amounts of low-fat dairy products, such as milk, cheese, or yogurt.  Eat moderate amounts of poultry and eggs.  Choose healthy (unsaturated) fats, such as nuts, olive oil, and certain nut or seed oils like canola, soybean, and flaxseed.  Limit unhealthy (saturated) fats, such as butter, palm oil, and coconut oil. And limit fats found in animal products, such as meat and dairy products made with whole milk. Try to eat red meat only a few times a month in very small amounts.    Limit sweets and desserts to only a few times a Health. If you have questions about a medical condition or this instruction, always ask your healthcare professional. [place] [address] any warranty or liability for your use of this information.

## 2022-05-10 NOTE — PROGRESS NOTES
Patient: Mike Reynolds is a 45 y.o. male who presents today with the following Chief Complaint(s):  Chief Complaint   Patient presents with    6 Month Follow-Up       HPI     Here today for 6 month follow up. Patient's primary language is D[place]. Interview is conducted via certified [employer] . Only complaint today is of allergies- sx: itchy nose, nasal drainage, sneezing; denies occular symptoms. Allergies have been bothering him for about 2-3 weeks. Has not been taking atorvastatin. Did not know it was prescribed. Reviewed labs with patient from November 2021 as he did not return multiple attempts made to contact him regarding results. Lab Results   Component Value Date     11/10/2021    K 4.1 11/10/2021     11/10/2021    CO2 26 11/10/2021    BUN 8 11/10/2021    CREATININE 0.9 11/10/2021    GLUCOSE 84 11/10/2021    CALCIUM 9.0 11/10/2021    PROT 7.6 11/10/2021    LABALBU 4.6 11/10/2021    BILITOT 0.5 11/10/2021    ALKPHOS 85 11/10/2021    AST 26 11/10/2021    ALT 40 11/10/2021    LABGLOM >60 11/10/2021    GFRAA >60 11/10/2021    AGRATIO 1.5 11/10/2021    GLOB 3.2 02/08/2021       Lab Results   Component Value Date    CHOL 258 (H) 11/10/2021    CHOL 262 (H) 02/08/2021     Lab Results   Component Value Date    TRIG 259 (H) 11/10/2021    TRIG 472 (H) 02/08/2021     Lab Results   Component Value Date    HDL 35 (L) 11/10/2021    HDL 31 (L) 02/08/2021     Lab Results   Component Value Date    LDLCALC 171 (H) 11/10/2021    LDLCALC see below 02/08/2021     Lab Results   Component Value Date    LABVLDL 52 11/10/2021    LABVLDL see below 02/08/2021     No results found for: [place]  Lab Results   Component Value Date    WBC 8.4 11/10/2021    HGB 14.2 11/10/2021    HCT 42.2 11/10/2021    MCV 83.2 11/10/2021     11/10/2021     Lab Results   Component Value Date    PSA 2.66 11/10/2021    PSA 2.68 02/08/2021       No Known Allergies   No past medical history on file.    No past surgical history on file. Social History     Socioeconomic History    Marital status:      Spouse name: Not on file    Number of children: Not on file    Years of education: Not on file    Highest education level: Not on file   Occupational History    Not on file   Tobacco Use    Smoking status: Never Smoker    Smokeless tobacco: Former User     Types: Chew   Vaping Use    Vaping Use: Never used   Substance and Sexual Activity    Alcohol use: Never    Drug use: Not on file    Sexual activity: Not on file   Other Topics Concern    Not on file   Social History Narrative    Not on file     Social Determinants of Health     Financial Resource Strain: Low Risk     Difficulty of Paying Living Expenses: Not hard at all   Food Insecurity: No Food Insecurity    Worried About [address] in the Last Year: Never true    [address] in the Last Year: Never true   Transportation Needs:     Lack of Transportation (Medical): Not on file    Lack of Transportation (Non-Medical):  Not on file   Physical Activity:     Days of Exercise per Week: Not on file    Minutes of Exercise per Session: Not on file   Stress:     Feeling of Stress : Not on file   Social Connections:     Frequency of Communication with Friends and Family: Not on file    Frequency of Social Gatherings with Friends and Family: Not on file    Attends Hinduism Services: Not on file    Active Member of [address] [place] [address] [employer] or Organizations: Not on file    Attends Club or Organization Meetings: Not on file    Marital Status: Not on file   Intimate Partner Violence:     Fear of Current or Ex-Partner: Not on file    Emotionally Abused: Not on file    Physically Abused: Not on file    Sexually Abused: Not on file   Housing Stability:     Unable to Pay for Housing in the Last Year: Not on file    Number of [place] in the Last Year: Not on file    Unstable Housing in the Last Year: Not on file     Family History   Problem Relation Age of Onset    High Blood Pressure Mother     High Blood Pressure Sister     High Blood Pressure Brother         Outpatient Medications Prior to Visit   Medication Sig Dispense Refill    atorvastatin (LIPITOR) 20 MG tablet Take 1 tablet by mouth daily 90 tablet 2     No facility-administered medications prior to visit. Patient'spast medical history, surgical history, family history, medications,  and allergies  were all reviewed and updated as appropriate today. Review of Systems    /86   Pulse 76   Wt 167 lb 12.8 oz (76.1 kg)   SpO2 97%   BMI 31.91 kg/m²   Physical Exam  Vitals and nursing note reviewed. Constitutional:       Appearance: He is well-developed. He is not toxic-appearing. HENT:      Head: Normocephalic. Right Ear: Tympanic membrane, ear canal and external ear normal.      Left Ear: Tympanic membrane, ear canal and external ear normal.      Nose: Congestion and rhinorrhea present. Right Turbinates: Pale. Left Turbinates: Pale. Right Sinus: No maxillary sinus tenderness or frontal sinus tenderness. Left Sinus: No maxillary sinus tenderness or frontal sinus tenderness. Mouth/Throat:      Pharynx: No oropharyngeal exudate or posterior oropharyngeal erythema. Eyes:      General: No scleral icterus. Extraocular Movements: Extraocular movements intact. Conjunctiva/sclera: Conjunctivae normal.      Pupils: Pupils are equal, round, and reactive to light. Neck:      Thyroid: No thyroid mass or thyromegaly. Vascular: No carotid bruit. Cardiovascular:      Rate and Rhythm: Normal rate and regular rhythm. Heart sounds: Normal heart sounds. No murmur heard. Pulmonary:      Effort: Pulmonary effort is normal.      Breath sounds: Normal breath sounds. Musculoskeletal:      Right lower leg: No edema. Left lower leg: No edema. Lymphadenopathy:      Cervical: No cervical adenopathy.    Neurological:      General: No focal deficit present. Mental Status: He is alert and oriented to person, place, and time. Psychiatric:         Mood and Affect: Mood normal.         Behavior: Behavior normal. Behavior is cooperative. ASSESSMENT/PLAN:    Problem List Items Addressed This Visit     Hyperlipidemia LDL goal <100 - Primary      Unfortunately, patient did not return multiple attempts at contacting him and his family to review labs from the fall so he never started on Lipitor. Discussed results of follow-up blood work with patient and his son at length. Start Lipitor 20 mg daily. Check CMP in 1 month. Relevant Medications    atorvastatin (LIPITOR) 20 MG tablet    Other Relevant Orders    Lipid Panel    Comprehensive Metabolic Panel    TSH with Reflex    Comprehensive Metabolic Panel    Language barrier affecting health care     Patient's primary language is Vietnamese. Several attempts were made to contact patient following his appointment in November 2021 to review lab results. Ultimately, a letter was sent to patient's home requesting a phone call which was never returned. Patient's son, who speaks [place] fluently, will place his number on chart as primary point of contact. Seasonal allergies      Add Allegra 180 mg daily and Flonase nose spray.            Other Visit Diagnoses     Well adult exam        Relevant Orders    Lipid Panel    Comprehensive Metabolic Panel    CBC with Auto Differential    PSA Screening    Prostate cancer screening        Relevant Orders    PSA Screening          Current Outpatient Medications   Medication Sig Dispense Refill    atorvastatin (LIPITOR) 20 MG tablet Take 1 tablet by mouth daily 90 tablet 2    fluticasone (FLONASE) 50 MCG/ACT nasal spray 2 sprays by Each Nostril route daily as needed for Rhinitis or Allergies 1 each 5    fexofenadine (ALLEGRA) 180 MG tablet Take 1 tablet by mouth daily as needed (allergies) 30 tablet 5     No current facility-administered medications for this visit. Return in about 6 months (around 11/10/2022) for labs prior.

## 2022-05-29 PROBLEM — Z75.8 LANGUAGE BARRIER: Status: ACTIVE | Noted: 2022-05-29

## 2022-05-29 PROBLEM — Z78.9 LANGUAGE BARRIER: Status: ACTIVE | Noted: 2022-05-29

## 2022-05-29 PROBLEM — J30.2 SEASONAL ALLERGIES: Status: ACTIVE | Noted: 2022-05-29

## 2022-05-29 PROBLEM — Z60.3 LANGUAGE BARRIER: Status: ACTIVE | Noted: 2022-05-29

## 2022-05-29 PROBLEM — E78.5 HYPERLIPIDEMIA LDL GOAL <100: Status: ACTIVE | Noted: 2022-05-29

## 2022-05-29 NOTE — ASSESSMENT & PLAN NOTE
Patient's primary language is Kishore. Several attempts were made to contact patient following his appointment in November 2021 to review lab results. Ultimately, a letter was sent to patient's home requesting a phone call which was never returned. Patient's son, who speaks [place] fluently, will place his number on chart as primary point of contact.

## 2022-05-29 NOTE — ASSESSMENT & PLAN NOTE
Unfortunately, patient did not return multiple attempts at contacting him and his family to review labs from the fall so he never started on Lipitor. Discussed results of follow-up blood work with patient and his son at length. Start Lipitor 20 mg daily. Check CMP in 1 month.

## 2022-06-10 DIAGNOSIS — Z00.00 WELL ADULT EXAM: ICD-10-CM

## 2022-06-10 DIAGNOSIS — E78.5 HYPERLIPIDEMIA LDL GOAL <100: ICD-10-CM

## 2022-06-10 DIAGNOSIS — Z12.5 PROSTATE CANCER SCREENING: ICD-10-CM

## 2022-06-10 LAB
A/G RATIO: 1.4 (ref 1.1–2.2)
ALBUMIN SERPL-MCNC: 4.6 G/DL (ref 3.4–5)
ALP BLD-CCNC: 91 U/L (ref 40–129)
ALT SERPL-CCNC: 30 U/L (ref 10–40)
ANION GAP SERPL CALCULATED.3IONS-SCNC: 14 MMOL/L (ref 3–16)
AST SERPL-CCNC: 26 U/L (ref 15–37)
BASOPHILS ABSOLUTE: 0.1 K/UL (ref 0–0.2)
BASOPHILS RELATIVE PERCENT: 0.7 %
BILIRUB SERPL-MCNC: 0.5 MG/DL (ref 0–1)
BUN BLDV-MCNC: 7 MG/DL (ref 7–20)
CALCIUM SERPL-MCNC: 9.1 MG/DL (ref 8.3–10.6)
CHLORIDE BLD-SCNC: 101 MMOL/L (ref 99–110)
CHOLESTEROL, TOTAL: 176 MG/DL (ref 0–199)
CO2: 25 MMOL/L (ref 21–32)
CREAT SERPL-MCNC: 0.9 MG/DL (ref 0.9–1.3)
EOSINOPHILS ABSOLUTE: 0.4 K/UL (ref 0–0.6)
EOSINOPHILS RELATIVE PERCENT: 4.5 %
GFR AFRICAN AMERICAN: >60
GFR NON-AFRICAN AMERICAN: >60
GLUCOSE BLD-MCNC: 90 MG/DL (ref 70–99)
HCT VFR BLD CALC: 40.2 % (ref 40.5–52.5)
HDLC SERPL-MCNC: 37 MG/DL (ref 40–60)
HEMOGLOBIN: 13.9 G/DL (ref 13.5–17.5)
LDL CHOLESTEROL CALCULATED: 98 MG/DL
LYMPHOCYTES ABSOLUTE: 2.3 K/UL (ref 1–5.1)
LYMPHOCYTES RELATIVE PERCENT: 26.6 %
MCH RBC QN AUTO: 28.4 PG (ref 26–34)
MCHC RBC AUTO-ENTMCNC: 34.6 G/DL (ref 31–36)
MCV RBC AUTO: 81.9 FL (ref 80–100)
MONOCYTES ABSOLUTE: 0.4 K/UL (ref 0–1.3)
MONOCYTES RELATIVE PERCENT: 4.7 %
NEUTROPHILS ABSOLUTE: 5.5 K/UL (ref 1.7–7.7)
NEUTROPHILS RELATIVE PERCENT: 63.5 %
PDW BLD-RTO: 14.2 % (ref 12.4–15.4)
PLATELET # BLD: 175 K/UL (ref 135–450)
PMV BLD AUTO: 10.3 FL (ref 5–10.5)
POTASSIUM SERPL-SCNC: 4.2 MMOL/L (ref 3.5–5.1)
PROSTATE SPECIFIC ANTIGEN: 2.47 NG/ML (ref 0–4)
RBC # BLD: 4.91 M/UL (ref 4.2–5.9)
SODIUM BLD-SCNC: 140 MMOL/L (ref 136–145)
T4 FREE: 1.3 NG/DL (ref 0.9–1.8)
TOTAL PROTEIN: 7.8 G/DL (ref 6.4–8.2)
TRIGL SERPL-MCNC: 206 MG/DL (ref 0–150)
TSH REFLEX: 8.68 UIU/ML (ref 0.27–4.2)
VLDLC SERPL CALC-MCNC: 41 MG/DL
WBC # BLD: 8.7 K/UL (ref 4–11)

## 2022-06-12 DIAGNOSIS — E03.9 ACQUIRED HYPOTHYROIDISM: Primary | ICD-10-CM

## 2022-06-12 RX ORDER — LEVOTHYROXINE SODIUM 25 MCG
25 TABLET ORAL
Qty: 30 TABLET | Refills: 3 | Status: SHIPPED | OUTPATIENT
Start: 2022-06-12

## 2022-06-20 ENCOUNTER — TELEPHONE (OUTPATIENT)
Dept: INTERNAL MEDICINE CLINIC | Age: 52
End: 2022-06-20

## 2022-06-20 NOTE — TELEPHONE ENCOUNTER
Pt calling ---has no idea why Synthroid was called in to his pharmacy for him----please call to let him know why---Thanks.

## 2022-06-23 ENCOUNTER — TELEPHONE (OUTPATIENT)
Dept: INTERNAL MEDICINE CLINIC | Age: 52
End: 2022-06-23

## 2022-06-23 NOTE — TELEPHONE ENCOUNTER
Pt calling back again about the Synthroid medicine that was called in to him---he wants to know why that medicine was called in for him----told him was for thyroid---but he still doesn't know why he was put on that medicine---please call the pt. Thanks.

## 2022-06-23 NOTE — TELEPHONE ENCOUNTER
Dr. Anuradha Hernandez called in Synthroid for pt due to his  Thyroid is a little underactive.   Called pt no VM is set up unable to leave a message

## 2022-11-01 DIAGNOSIS — E03.9 ACQUIRED HYPOTHYROIDISM: ICD-10-CM

## 2022-11-01 LAB
T4 FREE: 1.3 NG/DL (ref 0.9–1.8)
TSH REFLEX: 4.71 UIU/ML (ref 0.27–4.2)

## 2022-11-22 ENCOUNTER — OFFICE VISIT (OUTPATIENT)
Dept: INTERNAL MEDICINE CLINIC | Age: 52
End: 2022-11-22
Payer: COMMERCIAL

## 2022-11-22 VITALS
HEART RATE: 76 BPM | BODY MASS INDEX: 32.07 KG/M2 | OXYGEN SATURATION: 98 % | DIASTOLIC BLOOD PRESSURE: 88 MMHG | WEIGHT: 168.6 LBS | SYSTOLIC BLOOD PRESSURE: 128 MMHG

## 2022-11-22 DIAGNOSIS — J30.2 SEASONAL ALLERGIES: ICD-10-CM

## 2022-11-22 DIAGNOSIS — Z78.9 LANGUAGE BARRIER AFFECTING HEALTH CARE: ICD-10-CM

## 2022-11-22 DIAGNOSIS — Z00.00 WELL ADULT EXAM: ICD-10-CM

## 2022-11-22 DIAGNOSIS — Z12.5 PROSTATE CANCER SCREENING: ICD-10-CM

## 2022-11-22 DIAGNOSIS — E78.5 HYPERLIPIDEMIA LDL GOAL <100: ICD-10-CM

## 2022-11-22 DIAGNOSIS — E03.9 ACQUIRED HYPOTHYROIDISM: Primary | ICD-10-CM

## 2022-11-22 DIAGNOSIS — Z12.83 SKIN CANCER SCREENING: ICD-10-CM

## 2022-11-22 LAB
A/G RATIO: 1.4 (ref 1.1–2.2)
ALBUMIN SERPL-MCNC: 4.5 G/DL (ref 3.4–5)
ALP BLD-CCNC: 100 U/L (ref 40–129)
ALT SERPL-CCNC: 28 U/L (ref 10–40)
ANION GAP SERPL CALCULATED.3IONS-SCNC: 15 MMOL/L (ref 3–16)
AST SERPL-CCNC: 24 U/L (ref 15–37)
BILIRUB SERPL-MCNC: 0.3 MG/DL (ref 0–1)
BUN BLDV-MCNC: 6 MG/DL (ref 7–20)
CALCIUM SERPL-MCNC: 9.4 MG/DL (ref 8.3–10.6)
CHLORIDE BLD-SCNC: 102 MMOL/L (ref 99–110)
CHOLESTEROL, TOTAL: 151 MG/DL (ref 0–199)
CO2: 26 MMOL/L (ref 21–32)
CREAT SERPL-MCNC: 0.9 MG/DL (ref 0.9–1.3)
GFR SERPL CREATININE-BSD FRML MDRD: >60 ML/MIN/{1.73_M2}
GLUCOSE BLD-MCNC: 97 MG/DL (ref 70–99)
HDLC SERPL-MCNC: 36 MG/DL (ref 40–60)
LDL CHOLESTEROL CALCULATED: 80 MG/DL
POTASSIUM SERPL-SCNC: 4.3 MMOL/L (ref 3.5–5.1)
SODIUM BLD-SCNC: 143 MMOL/L (ref 136–145)
TOTAL PROTEIN: 7.7 G/DL (ref 6.4–8.2)
TRIGL SERPL-MCNC: 177 MG/DL (ref 0–150)
VLDLC SERPL CALC-MCNC: 35 MG/DL

## 2022-11-22 PROCEDURE — 99214 OFFICE O/P EST MOD 30 MIN: CPT | Performed by: INTERNAL MEDICINE

## 2022-11-22 PROCEDURE — 1036F TOBACCO NON-USER: CPT | Performed by: INTERNAL MEDICINE

## 2022-11-22 PROCEDURE — 90674 CCIIV4 VAC NO PRSV 0.5 ML IM: CPT | Performed by: INTERNAL MEDICINE

## 2022-11-22 PROCEDURE — 3017F COLORECTAL CA SCREEN DOC REV: CPT | Performed by: INTERNAL MEDICINE

## 2022-11-22 PROCEDURE — G8427 DOCREV CUR MEDS BY ELIG CLIN: HCPCS | Performed by: INTERNAL MEDICINE

## 2022-11-22 PROCEDURE — G8417 CALC BMI ABV UP PARAM F/U: HCPCS | Performed by: INTERNAL MEDICINE

## 2022-11-22 PROCEDURE — G8484 FLU IMMUNIZE NO ADMIN: HCPCS | Performed by: INTERNAL MEDICINE

## 2022-11-22 RX ORDER — LEVOTHYROXINE SODIUM 0.03 MG/1
25 TABLET ORAL
Qty: 90 TABLET | Refills: 2 | Status: SHIPPED | OUTPATIENT
Start: 2022-11-22

## 2022-11-22 RX ORDER — FLUTICASONE PROPIONATE 50 MCG
2 SPRAY, SUSPENSION (ML) NASAL DAILY PRN
Qty: 1 EACH | Refills: 5 | Status: SHIPPED | OUTPATIENT
Start: 2022-11-22

## 2022-11-22 RX ORDER — ATORVASTATIN CALCIUM 20 MG/1
20 TABLET, FILM COATED ORAL DAILY
Qty: 90 TABLET | Refills: 2 | Status: SHIPPED | OUTPATIENT
Start: 2022-11-22

## 2022-11-22 RX ORDER — CETIRIZINE HYDROCHLORIDE 10 MG/1
10 TABLET ORAL NIGHTLY PRN
Qty: 90 TABLET | Refills: 3 | Status: SHIPPED | OUTPATIENT
Start: 2022-11-22

## 2022-11-22 ASSESSMENT — PATIENT HEALTH QUESTIONNAIRE - PHQ9
SUM OF ALL RESPONSES TO PHQ QUESTIONS 1-9: 0
SUM OF ALL RESPONSES TO PHQ QUESTIONS 1-9: 0
SUM OF ALL RESPONSES TO PHQ9 QUESTIONS 1 & 2: 0
SUM OF ALL RESPONSES TO PHQ QUESTIONS 1-9: 0
SUM OF ALL RESPONSES TO PHQ QUESTIONS 1-9: 0
1. LITTLE INTEREST OR PLEASURE IN DOING THINGS: 0
2. FEELING DOWN, DEPRESSED OR HOPELESS: 0

## 2022-11-22 ASSESSMENT — ENCOUNTER SYMPTOMS
SHORTNESS OF BREATH: 0
CONSTIPATION: 0
DIARRHEA: 0
CHEST TIGHTNESS: 0

## 2022-11-22 NOTE — ASSESSMENT & PLAN NOTE
Office visit was prolonged due to increased needs on patient's behalf due to language barrier. We did reach out to the optometrist office recommended through his employer. Per the optometrist office, they do not accept patients [employer]. Instructed patient to return to [facility], where he had previous eye exam, before his new eye exam as well as new glasses requirements. We did reach out to the dermatologist office to ensure that they excepted patient's insurance. Patient does have a son who speaks English she will call and schedule the appointment. Patient did have a colonoscopy done in [place]. I can see evidence of an endoscopy report from 2019. We will try to reach out to that facility to get his actual colonoscopy report.

## 2022-11-22 NOTE — PATIENT INSTRUCTIONS
Please have your labs drawn about 1 week prior to your next appointment in May . You may get your labs drawn in our office, Monday-Friday from 7:30 am-4:00 pm. You do not need an appointment. If this does not work for you, you may also have your labs drawn at [facility] earlier during the week or on weekends. If you prefer to have your labs draw elsewhere ([facility]), please allow a little more time for me to get your results and please call the office ahead of your appointment so that we may make sure that we have your labs at the time of your appointment. We sometimes need to call the lab directly (when not done at a  [facility] lab) to get your results. Your labs are fasting.    fasting (nothing to eat w/in 8 hours of the lab test, is ok to drink water or black coffee)

## 2022-11-22 NOTE — PROGRESS NOTES
Patient: Madeleine Goodwin is a 45 y.o. male who presents today with the following Chief Complaint(s):  Chief Complaint   Patient presents with    Check-Up       HPI    Patient's primary language is Kishore. Interview is conducted via certified K[place]ore . Here today for follow up. Is feeling well. Was taking Synthroid 25 mcg qd. Stopped taking after 2 months. Last took Synthroid about 4-5 days ago. Is also due for labs for cholesterol. Doing well with Lipitor. Would like to go back on oral antihistamine for allergies. Was previously on Allegra. Has also brought a paper in from work for an eye exam and states that he needs a letter from me. Unclear what is actually needed and we will need to call the optometrist's office. States that he had a colonoscopy in 2018 or 2019 and was normal. Was told to repeat colonoscopy in 10 years. We called and was told that the office does not take his insurance. Previously got glasses [facility]. Advised patient to return to [facility] for eye exam and glasses. Results for orders placed or performed in visit on 11/01/22   TSH with Reflex   Result Value Ref Range    TSH 4.71 (H) 0.27 - 4.20 uIU/mL   T4, Free   Result Value Ref Range    T4 Free 1.3 0.9 - 1.8 ng/dL            No Known Allergies   No past medical history on file. No past surgical history on file.    Social History     Socioeconomic History    Marital status:      Spouse name: Not on file    Number of children: Not on file    Years of education: Not on file    Highest education level: Not on file   Occupational History    Not on file   Tobacco Use    Smoking status: Never    Smokeless tobacco: Former     Types: Chew   Vaping Use    Vaping Use: Never used   Substance and Sexual Activity    Alcohol use: Never    Drug use: Not on file    Sexual activity: Not on file   Other Topics Concern    Not on file   Social History Narrative    Not on file     Social Determinants of Health Financial Resource Strain: Low Risk     Difficulty of Paying Living Expenses: Not hard at all   Food Insecurity: No Food Insecurity    Worried About Running Out of Food in the Last Year: Never true    Ran Out of Food in the Last Year: Never true   Transportation Needs: Not on file   Physical Activity: Not on file   Stress: Not on file   Social Connections: Not on file   Intimate Partner Violence: Not on file   Housing Stability: Not on file     Family History   Problem Relation Age of Onset    High Blood Pressure Mother     High Blood Pressure Sister     High Blood Pressure Brother         Outpatient Medications Prior to Visit   Medication Sig Dispense Refill    SYNTHROID 25 MCG tablet Take 1 tablet by mouth every morning (before breakfast) 30 tablet 3    atorvastatin (LIPITOR) 20 MG tablet Take 1 tablet by mouth daily 90 tablet 2    fluticasone (FLONASE) 50 MCG/ACT nasal spray 2 sprays by Each Nostril route daily as needed for Rhinitis or Allergies 1 each 5     No facility-administered medications prior to visit. Patient'spast medical history, surgical history, family history, medications,  and allergies  were all reviewed and updated as appropriate today. Review of Systems   Constitutional:  Negative for appetite change, fatigue and fever. Respiratory:  Negative for chest tightness and shortness of breath. Cardiovascular:  Negative for chest pain. Gastrointestinal:  Negative for constipation and diarrhea. Skin:  Negative for rash. /88   Pulse 76   Wt 168 lb 9.6 oz (76.5 kg)   SpO2 98%   BMI 32.07 kg/m²   Physical Exam  Vitals and nursing note reviewed. Constitutional:       Appearance: He is well-developed. He is not toxic-appearing. HENT:      Head: Normocephalic.       Right Ear: Tympanic membrane, ear canal and external ear normal.      Left Ear: Tympanic membrane, ear canal and external ear normal.      Mouth/Throat:      Pharynx: No oropharyngeal exudate or posterior health insurance. Instructed patient to return to [place], where he had previous eye exam, before his new eye exam as well as new glasses requirements. We did reach out to the dermatologist office to ensure that they excepted patient's insurance. Patient does have a son who speaks English she will call and schedule the appointment. Patient did have a colonoscopy done in [place]. I can see evidence of an endoscopy report from 2019. We will try to reach out to that facility to get his actual colonoscopy report. Seasonal allergies     Insurance does not appear to cover [place]. Add Zyrtec 10 mg daily as needed. Skin cancer screening      Referral placed to dermatology. Relevant Orders    Sheryle Cornell MD, Dermatology, [facility]     Other Visit Diagnoses       Prostate cancer screening        Relevant Orders    PSA Screening    Well adult exam        Relevant Orders    Lipid Panel    Comprehensive Metabolic Panel    CBC with Auto Differential    PSA Screening            Current Outpatient Medications   Medication Sig Dispense Refill    levothyroxine (SYNTHROID) 25 MCG tablet Take 1 tablet by mouth every morning (before breakfast) 90 tablet 2    atorvastatin (LIPITOR) 20 MG tablet Take 1 tablet by mouth daily 90 tablet 2    fluticasone (FLONASE) 50 MCG/ACT nasal spray 2 sprays by Each Nostril route daily as needed for Rhinitis or Allergies 1 each 5    cetirizine (ZYRTEC) 10 MG tablet Take 1 tablet by mouth nightly as needed for Allergies or Rhinitis 90 tablet 3     No current facility-administered medications for this visit. Return in about 6 months (around 5/22/2023) for labs prior.

## 2022-11-22 NOTE — ASSESSMENT & PLAN NOTE
Long discussion with patient regarding diagnosis of hypothyroidism and ongoing need for medication. Continue Synthroid 25 mcg daily. Check TSH in 6 months.

## 2022-12-22 PROBLEM — Z12.83 SKIN CANCER SCREENING: Status: RESOLVED | Noted: 2022-11-22 | Resolved: 2022-12-22

## 2023-01-30 ENCOUNTER — TELEPHONE (OUTPATIENT)
Dept: INTERNAL MEDICINE CLINIC | Age: 53
End: 2023-01-30

## 2023-01-30 NOTE — TELEPHONE ENCOUNTER
Patient is returning missed call from office. I do not see any telephone encounters in chart. Please call patient back.    Thank you

## 2023-01-30 NOTE — TELEPHONE ENCOUNTER
Spoke with pt and let him know that I or Dr Yaya Perez had called and left message. I reviewed the chart and there were no messages or test results for the pt.

## 2025-05-19 ENCOUNTER — OFFICE VISIT (OUTPATIENT)
Dept: INTERNAL MEDICINE CLINIC | Age: 55
End: 2025-05-19
Payer: COMMERCIAL

## 2025-05-19 VITALS
HEART RATE: 61 BPM | HEIGHT: 61 IN | SYSTOLIC BLOOD PRESSURE: 130 MMHG | DIASTOLIC BLOOD PRESSURE: 88 MMHG | WEIGHT: 170 LBS | OXYGEN SATURATION: 99 % | BODY MASS INDEX: 32.1 KG/M2

## 2025-05-19 DIAGNOSIS — Z12.11 COLON CANCER SCREENING: ICD-10-CM

## 2025-05-19 DIAGNOSIS — E78.5 HYPERLIPIDEMIA LDL GOAL <100: ICD-10-CM

## 2025-05-19 DIAGNOSIS — Z12.5 PROSTATE CANCER SCREENING: ICD-10-CM

## 2025-05-19 DIAGNOSIS — E03.9 ACQUIRED HYPOTHYROIDISM: ICD-10-CM

## 2025-05-19 DIAGNOSIS — Z11.4 ENCOUNTER FOR SCREENING FOR HIV: ICD-10-CM

## 2025-05-19 DIAGNOSIS — Z60.3 LANGUAGE BARRIER AFFECTING HEALTH CARE: ICD-10-CM

## 2025-05-19 DIAGNOSIS — Z11.59 ENCOUNTER FOR HEPATITIS C SCREENING TEST FOR LOW RISK PATIENT: ICD-10-CM

## 2025-05-19 DIAGNOSIS — Z00.00 WELL ADULT EXAM: Primary | ICD-10-CM

## 2025-05-19 DIAGNOSIS — Z00.00 WELL ADULT EXAM: ICD-10-CM

## 2025-05-19 DIAGNOSIS — Z75.8 LANGUAGE BARRIER AFFECTING HEALTH CARE: ICD-10-CM

## 2025-05-19 PROCEDURE — 90715 TDAP VACCINE 7 YRS/> IM: CPT | Performed by: INTERNAL MEDICINE

## 2025-05-19 PROCEDURE — 90677 PCV20 VACCINE IM: CPT | Performed by: INTERNAL MEDICINE

## 2025-05-19 PROCEDURE — 90471 IMMUNIZATION ADMIN: CPT | Performed by: INTERNAL MEDICINE

## 2025-05-19 PROCEDURE — 90472 IMMUNIZATION ADMIN EACH ADD: CPT | Performed by: INTERNAL MEDICINE

## 2025-05-19 PROCEDURE — 99396 PREV VISIT EST AGE 40-64: CPT | Performed by: INTERNAL MEDICINE

## 2025-05-19 SDOH — ECONOMIC STABILITY: FOOD INSECURITY: WITHIN THE PAST 12 MONTHS, YOU WORRIED THAT YOUR FOOD WOULD RUN OUT BEFORE YOU GOT MONEY TO BUY MORE.: NEVER TRUE

## 2025-05-19 SDOH — ECONOMIC STABILITY: FOOD INSECURITY: WITHIN THE PAST 12 MONTHS, THE FOOD YOU BOUGHT JUST DIDN'T LAST AND YOU DIDN'T HAVE MONEY TO GET MORE.: NEVER TRUE

## 2025-05-19 SDOH — SOCIAL STABILITY - SOCIAL INSECURITY: ACCULTURATION DIFFICULTY: Z60.3

## 2025-05-19 ASSESSMENT — ENCOUNTER SYMPTOMS
DIARRHEA: 0
SHORTNESS OF BREATH: 0
CONSTIPATION: 0
CHEST TIGHTNESS: 0

## 2025-05-19 ASSESSMENT — PATIENT HEALTH QUESTIONNAIRE - PHQ9
SUM OF ALL RESPONSES TO PHQ QUESTIONS 1-9: 0
1. LITTLE INTEREST OR PLEASURE IN DOING THINGS: NOT AT ALL
SUM OF ALL RESPONSES TO PHQ QUESTIONS 1-9: 0
2. FEELING DOWN, DEPRESSED OR HOPELESS: NOT AT ALL

## 2025-05-19 NOTE — PROGRESS NOTES
Patient: Jaret Ferrer is a 54 y.o. male who presents today with the following Chief Complaint(s):  Chief Complaint   Patient presents with    Follow-up       HPI    Here today for follow up. Last seen in November 2022.  Primary language in Croatian. Declines  in favor of family member.     Not currently taking any medication. Previously on levothyroxine and atorvastatin. Stopped taking when rx ran out.     Denies CP or SOB. Asking for flu vaccine.     Exercise- lifts weights 1-2 times per week.     Diet- well rounded with vegetables, fruits, rice.  Drinks water and juice (1 glass/day).  Coffee 2 cups per day.     Alcohol- none    Tobacco- none.     Dentist- no.     Eye exam - not covered by insurance. Does wear glasses and are \"still good\".     Had colonoscopy in 2019 in [place] (no records available). Was reportedly normal and due again in 10 years.     No Known Allergies   No past medical history on file.   No past surgical history on file.   Social History     Socioeconomic History    Marital status:      Spouse name: Not on file    Number of children: Not on file    Years of education: Not on file    Highest education level: Not on file   Occupational History    Not on file   Tobacco Use    Smoking status: Never    Smokeless tobacco: Former     Types: Chew   Vaping Use    Vaping status: Never Used   Substance and Sexual Activity    Alcohol use: Never    Drug use: Not on file    Sexual activity: Not on file   Other Topics Concern    Not on file   Social History Narrative    Not on file     Social Drivers of Health     Financial Resource Strain: Low Risk  (5/10/2022)    Overall Financial Resource Strain (CARDIA)     Difficulty of Paying Living Expenses: Not hard at all   Food Insecurity: No Food Insecurity (5/19/2025)    Hunger Vital Sign     Worried About Running Out of Food in the Last Year: Never true     Ran Out of Food in the Last Year: Never true   Transportation Needs: No Transportation Needs

## 2025-05-19 NOTE — ASSESSMENT & PLAN NOTE
Patient had colonoscopy in [place] in 2018 or 2019.  He does not recall the location or name of the hospital cannot get a copy of his colonoscopy.  Check FIT test.

## 2025-05-19 NOTE — ASSESSMENT & PLAN NOTE
No longer taking Lipitor as his prescription ran out when he did not return for follow-up appointments.  Previously tolerated Lipitor without difficulty.  Check CMP, lipid panel, TSH.  If necessary will resume Lipitor 20 mg daily.

## 2025-05-19 NOTE — ASSESSMENT & PLAN NOTE
No longer taking levothyroxine-he has been off for approximately 2 years.  Check TSH and resume levothyroxine if needed.  Was previously on levothyroxine 25 mcg daily.

## 2025-05-19 NOTE — ASSESSMENT & PLAN NOTE
No problems identified on exam.  Check fasting blood work.  Will give vaccine today.   Encourage patient to schedule appointment with the dentist.  Check PSA.  Check FIT stool test.  Colonoscopy done in [place] in 2018 or 2019.  Patient does not recall where so we are unable to request a copy of his colonoscopy.  Tdap and Prevnar 20 given today.  Recommend Shingrix-he will return on 6/6/2025 to get his first injection.

## 2025-05-20 ENCOUNTER — RESULTS FOLLOW-UP (OUTPATIENT)
Dept: INTERNAL MEDICINE CLINIC | Age: 55
End: 2025-05-20

## 2025-05-20 DIAGNOSIS — E78.5 HYPERLIPIDEMIA LDL GOAL <100: ICD-10-CM

## 2025-05-20 DIAGNOSIS — E03.9 ACQUIRED HYPOTHYROIDISM: Primary | ICD-10-CM

## 2025-05-20 LAB
ALBUMIN SERPL-MCNC: 4.4 G/DL (ref 3.4–5)
ALBUMIN/GLOB SERPL: 1.4 {RATIO} (ref 1.1–2.2)
ALP SERPL-CCNC: 87 U/L (ref 40–129)
ALT SERPL-CCNC: 34 U/L (ref 10–40)
ANION GAP SERPL CALCULATED.3IONS-SCNC: 8 MMOL/L (ref 3–16)
AST SERPL-CCNC: 27 U/L (ref 15–37)
BASOPHILS # BLD: 0.1 K/UL (ref 0–0.2)
BASOPHILS NFR BLD: 1.1 %
BILIRUB SERPL-MCNC: 0.3 MG/DL (ref 0–1)
BUN SERPL-MCNC: 7 MG/DL (ref 7–20)
CALCIUM SERPL-MCNC: 8.7 MG/DL (ref 8.3–10.6)
CHLORIDE SERPL-SCNC: 105 MMOL/L (ref 99–110)
CHOLEST SERPL-MCNC: 255 MG/DL (ref 0–199)
CO2 SERPL-SCNC: 27 MMOL/L (ref 21–32)
CREAT SERPL-MCNC: 0.9 MG/DL (ref 0.9–1.3)
DEPRECATED RDW RBC AUTO: 13.9 % (ref 12.4–15.4)
EOSINOPHIL # BLD: 0.5 K/UL (ref 0–0.6)
EOSINOPHIL NFR BLD: 6.2 %
GFR SERPLBLD CREATININE-BSD FMLA CKD-EPI: >90 ML/MIN/{1.73_M2}
GLUCOSE SERPL-MCNC: 92 MG/DL (ref 70–99)
HCT VFR BLD AUTO: 43.3 % (ref 40.5–52.5)
HDLC SERPL-MCNC: 33 MG/DL (ref 40–60)
HGB BLD-MCNC: 14.8 G/DL (ref 13.5–17.5)
HIV 1+2 AB+HIV1 P24 AG SERPL QL IA: NORMAL
HIV 2 AB SERPL QL IA: NORMAL
HIV1 AB SERPL QL IA: NORMAL
HIV1 P24 AG SERPL QL IA: NORMAL
LDLC SERPL CALC-MCNC: ABNORMAL MG/DL
LDLC SERPL-MCNC: 142 MG/DL
LYMPHOCYTES # BLD: 2 K/UL (ref 1–5.1)
LYMPHOCYTES NFR BLD: 27.1 %
MCH RBC QN AUTO: 28.5 PG (ref 26–34)
MCHC RBC AUTO-ENTMCNC: 34.1 G/DL (ref 31–36)
MCV RBC AUTO: 83.4 FL (ref 80–100)
MONOCYTES # BLD: 0.5 K/UL (ref 0–1.3)
MONOCYTES NFR BLD: 6.3 %
NEUTROPHILS # BLD: 4.4 K/UL (ref 1.7–7.7)
NEUTROPHILS NFR BLD: 59.3 %
PLATELET # BLD AUTO: 191 K/UL (ref 135–450)
PMV BLD AUTO: 10.6 FL (ref 5–10.5)
POTASSIUM SERPL-SCNC: 4 MMOL/L (ref 3.5–5.1)
PROT SERPL-MCNC: 7.5 G/DL (ref 6.4–8.2)
PSA SERPL DL<=0.01 NG/ML-MCNC: 5.63 NG/ML (ref 0–4)
RBC # BLD AUTO: 5.19 M/UL (ref 4.2–5.9)
SODIUM SERPL-SCNC: 140 MMOL/L (ref 136–145)
T4 FREE SERPL-MCNC: 0.9 NG/DL (ref 0.9–1.8)
TRIGL SERPL-MCNC: 379 MG/DL (ref 0–150)
TSH SERPL DL<=0.005 MIU/L-ACNC: 5.46 UIU/ML (ref 0.27–4.2)
VLDLC SERPL CALC-MCNC: ABNORMAL MG/DL
WBC # BLD AUTO: 7.4 K/UL (ref 4–11)

## 2025-05-20 RX ORDER — LEVOTHYROXINE SODIUM 25 UG/1
25 TABLET ORAL DAILY
Qty: 30 TABLET | Refills: 5 | Status: SHIPPED | OUTPATIENT
Start: 2025-05-20 | End: 2025-07-15 | Stop reason: DRUGHIGH

## 2025-05-20 RX ORDER — ATORVASTATIN CALCIUM 20 MG/1
20 TABLET, FILM COATED ORAL DAILY
Qty: 30 TABLET | Refills: 5 | Status: SHIPPED | OUTPATIENT
Start: 2025-05-20

## 2025-05-21 ENCOUNTER — TELEPHONE (OUTPATIENT)
Dept: INTERNAL MEDICINE CLINIC | Age: 55
End: 2025-05-21

## 2025-05-21 NOTE — TELEPHONE ENCOUNTER
Patient is calling stating he was in Monday and got two vaccines , since then he has had a fever and has been nauseous . He would like to have a note for work with not being able to come in due to the symptoms . Please advise

## 2025-05-21 NOTE — TELEPHONE ENCOUNTER
Ok for work note. I don't think this is from vaccines. Maybe he has a stomach bug. How high has his fever been?

## 2025-05-21 NOTE — TELEPHONE ENCOUNTER
Left message for the patient to return my call. When the patient calls back please read and ask questions in the message below. The patient's letter is at the  ready for .

## 2025-05-22 LAB
HCV AB S/CO SERPL IA: 0.09 IV
HCV AB SERPL QL IA: NEGATIVE

## 2025-06-18 PROBLEM — Z12.11 COLON CANCER SCREENING: Status: RESOLVED | Noted: 2021-11-10 | Resolved: 2025-06-18

## 2025-06-18 PROBLEM — Z00.00 WELL ADULT EXAM: Status: RESOLVED | Noted: 2020-02-10 | Resolved: 2025-06-18

## 2025-06-27 ENCOUNTER — OFFICE VISIT (OUTPATIENT)
Dept: INTERNAL MEDICINE CLINIC | Age: 55
End: 2025-06-27

## 2025-06-27 VITALS
DIASTOLIC BLOOD PRESSURE: 82 MMHG | HEIGHT: 60 IN | TEMPERATURE: 97.6 F | OXYGEN SATURATION: 99 % | HEART RATE: 75 BPM | BODY MASS INDEX: 32.35 KG/M2 | SYSTOLIC BLOOD PRESSURE: 138 MMHG | WEIGHT: 164.8 LBS

## 2025-06-27 DIAGNOSIS — Z60.3 LANGUAGE BARRIER AFFECTING HEALTH CARE: ICD-10-CM

## 2025-06-27 DIAGNOSIS — Z75.8 LANGUAGE BARRIER AFFECTING HEALTH CARE: ICD-10-CM

## 2025-06-27 DIAGNOSIS — M54.50 ACUTE BILATERAL LOW BACK PAIN WITHOUT SCIATICA: Primary | ICD-10-CM

## 2025-06-27 RX ORDER — NAPROXEN 500 MG/1
500 TABLET ORAL 2 TIMES DAILY WITH MEALS
Qty: 60 TABLET | Refills: 3 | Status: SHIPPED | OUTPATIENT
Start: 2025-06-27

## 2025-06-27 RX ORDER — ACETAMINOPHEN 325 MG/1
325 TABLET ORAL EVERY 4 HOURS PRN
COMMUNITY
Start: 2025-01-13

## 2025-06-27 SDOH — SOCIAL STABILITY - SOCIAL INSECURITY: ACCULTURATION DIFFICULTY: Z60.3

## 2025-06-27 ASSESSMENT — ENCOUNTER SYMPTOMS: BACK PAIN: 1

## 2025-06-27 NOTE — PROGRESS NOTES
Office Visit   6/27/2025    Assessment and Plan:  Diagnoses and all orders for this visit:    Acute bilateral low back pain without sciatica  -  Acute, new problem  -  Likely muscular in nature, exacerbated by improper lifting techniques and repetitive heavy lifting at work  -  Prescription for anti-inflammatory medication to be taken twice daily with food; discontinue if stomach discomfort occurs  - Muscle relaxant prescribed for nightly use to alleviate muscle tension during sleep; can be taken 3 times a day if not driving  - Refer for physical therapy  - Work note issued recommending refraining from lifting more than 30 pounds continuously and suggesting a job role that does not require heavy lifting  - Follow-up appointment with Dr. Benitez or myself advised if no improvement within 3 to 4 weeks  -     naproxen (NAPROSYN) 500 MG tablet; Take 1 tablet by mouth 2 times daily (with meals)  -     tiZANidine (ZANAFLEX) 4 MG tablet; Take 1 tablet by mouth 3 times daily as needed (muscle spasm)  -     [facility] - [facility]    Language barrier affecting health care  Pt is a Frisian speaking patient. A video interpretor was offered and declined. Son present and helping provide history.    Assessment & Plan  Low back pain  - Likely muscular in nature, exacerbated by improper lifting techniques at work  - Physical examination revealed no significant pain upon leg lifting or palpation of the back  - Prescription for anti-inflammatory medication to be taken twice daily with food; discontinue if stomach discomfort occurs  - Muscle relaxant prescribed for nightly use to alleviate muscle tension during sleep; can be taken 3 times a day if not driving  - Referral for physical therapy to address improper body mechanics during work  - Work note issued recommending refraining from lifting more than 30 pounds continuously and suggesting a job role that does not require heavy lifting  - Follow-up appointment with

## 2025-07-14 ENCOUNTER — TELEPHONE (OUTPATIENT)
Dept: INTERNAL MEDICINE CLINIC | Age: 55
End: 2025-07-14

## 2025-07-14 DIAGNOSIS — E03.9 ACQUIRED HYPOTHYROIDISM: ICD-10-CM

## 2025-07-14 DIAGNOSIS — E78.5 HYPERLIPIDEMIA LDL GOAL <100: ICD-10-CM

## 2025-07-14 LAB
ALBUMIN SERPL-MCNC: 4.2 G/DL (ref 3.4–5)
ALBUMIN/GLOB SERPL: 1.4 {RATIO} (ref 1.1–2.2)
ALP SERPL-CCNC: 82 U/L (ref 40–129)
ALT SERPL-CCNC: 34 U/L (ref 10–40)
ANION GAP SERPL CALCULATED.3IONS-SCNC: 10 MMOL/L (ref 3–16)
AST SERPL-CCNC: 26 U/L (ref 15–37)
BILIRUB SERPL-MCNC: 0.4 MG/DL (ref 0–1)
BUN SERPL-MCNC: 8 MG/DL (ref 7–20)
CALCIUM SERPL-MCNC: 8.6 MG/DL (ref 8.3–10.6)
CHLORIDE SERPL-SCNC: 104 MMOL/L (ref 99–110)
CO2 SERPL-SCNC: 27 MMOL/L (ref 21–32)
CREAT SERPL-MCNC: 0.9 MG/DL (ref 0.9–1.3)
GFR SERPLBLD CREATININE-BSD FMLA CKD-EPI: >90 ML/MIN/{1.73_M2}
GLUCOSE SERPL-MCNC: 85 MG/DL (ref 70–99)
POTASSIUM SERPL-SCNC: 4.5 MMOL/L (ref 3.5–5.1)
PROT SERPL-MCNC: 7.2 G/DL (ref 6.4–8.2)
SODIUM SERPL-SCNC: 141 MMOL/L (ref 136–145)
T4 FREE SERPL-MCNC: 1.1 NG/DL (ref 0.9–1.8)
TSH SERPL DL<=0.005 MIU/L-ACNC: 6.06 UIU/ML (ref 0.27–4.2)

## 2025-07-14 NOTE — TELEPHONE ENCOUNTER
Patient called in to check on status of forms that were dropped off this morning. He states someone told him he would receive a call in a couple hours. I advised patient that Tavia has been seeing patients since she got here. Patient was advised that her team would give them a call when these were ready.

## 2025-07-15 ENCOUNTER — TELEPHONE (OUTPATIENT)
Dept: INTERNAL MEDICINE CLINIC | Age: 55
End: 2025-07-15

## 2025-07-15 DIAGNOSIS — E03.9 ACQUIRED HYPOTHYROIDISM: ICD-10-CM

## 2025-07-15 RX ORDER — LEVOTHYROXINE SODIUM 50 UG/1
50 TABLET ORAL DAILY
Qty: 30 TABLET | Refills: 5 | Status: SHIPPED | OUTPATIENT
Start: 2025-07-15

## 2025-07-15 NOTE — PROGRESS NOTES
Higher dose of Synthroid sent to [facility] in [place]. If this is incorrect pharmacy, please pend to appropriate pharmacy. Thanks will need TSH in 6-8 weeks.

## 2025-07-15 NOTE — TELEPHONE ENCOUNTER
Called pt back with , could not leave voicemail due to alert saying phone is currently not in service

## 2025-07-16 NOTE — TELEPHONE ENCOUNTER
Pt needs functional capacity exam which can not be done at [facility], pr picked the form back up and will be taking it to [facility]